# Patient Record
Sex: FEMALE | Race: WHITE | NOT HISPANIC OR LATINO | Employment: UNEMPLOYED | URBAN - METROPOLITAN AREA
[De-identification: names, ages, dates, MRNs, and addresses within clinical notes are randomized per-mention and may not be internally consistent; named-entity substitution may affect disease eponyms.]

---

## 2017-12-06 ENCOUNTER — HISTORICAL (OUTPATIENT)
Dept: LAB | Facility: HOSPITAL | Age: 47
End: 2017-12-06

## 2018-10-17 ENCOUNTER — TELEPHONE (OUTPATIENT)
Dept: TRANSPLANT | Facility: CLINIC | Age: 48
End: 2018-10-17

## 2018-10-19 DIAGNOSIS — I51.89 ATRIAL MASS: ICD-10-CM

## 2018-10-19 DIAGNOSIS — I35.8 AORTIC VALVE MASS: Primary | ICD-10-CM

## 2018-10-30 ENCOUNTER — OFFICE VISIT (OUTPATIENT)
Dept: CARDIOTHORACIC SURGERY | Facility: CLINIC | Age: 48
End: 2018-10-30
Payer: COMMERCIAL

## 2018-10-30 ENCOUNTER — HOSPITAL ENCOUNTER (OUTPATIENT)
Dept: CARDIOLOGY | Facility: CLINIC | Age: 48
Discharge: HOME | End: 2018-10-30
Attending: THORACIC SURGERY (CARDIOTHORACIC VASCULAR SURGERY)
Payer: COMMERCIAL

## 2018-10-30 VITALS
OXYGEN SATURATION: 98 % | WEIGHT: 221.69 LBS | SYSTOLIC BLOOD PRESSURE: 132 MMHG | TEMPERATURE: 98 F | DIASTOLIC BLOOD PRESSURE: 78 MMHG | HEIGHT: 62 IN | BODY MASS INDEX: 40.8 KG/M2 | HEART RATE: 70 BPM

## 2018-10-30 DIAGNOSIS — I51.89 MASS OF RIGHT CARDIAC VENTRICLE: ICD-10-CM

## 2018-10-30 DIAGNOSIS — I51.89 MASS OF HEART: Primary | ICD-10-CM

## 2018-10-30 DIAGNOSIS — I51.89 ATRIAL MASS: ICD-10-CM

## 2018-10-30 LAB
AORTIC VALVE REGURGITATION: ABNORMAL
ESTIMATED PA SYSTOLIC PRESSURE: 29.72
MITRAL VALVE MOBILITY: NORMAL
RETIRED EF AND QEF - SEE NOTES: 55 (ref 55–65)
TRICUSPID VALVE REGURGITATION: ABNORMAL

## 2018-10-30 PROCEDURE — 99999 PR PBB SHADOW E&M-EST. PATIENT-LVL III: CPT | Mod: PBBFAC,,, | Performed by: THORACIC SURGERY (CARDIOTHORACIC VASCULAR SURGERY)

## 2018-10-30 PROCEDURE — 3008F BODY MASS INDEX DOCD: CPT | Mod: CPTII,S$GLB,, | Performed by: THORACIC SURGERY (CARDIOTHORACIC VASCULAR SURGERY)

## 2018-10-30 PROCEDURE — 93306 TTE W/DOPPLER COMPLETE: CPT | Mod: S$GLB,,, | Performed by: INTERNAL MEDICINE

## 2018-10-30 PROCEDURE — 99205 OFFICE O/P NEW HI 60 MIN: CPT | Mod: S$GLB,,, | Performed by: THORACIC SURGERY (CARDIOTHORACIC VASCULAR SURGERY)

## 2018-10-30 RX ORDER — CIPROFLOXACIN 500 MG/1
500 TABLET ORAL 2 TIMES DAILY
Refills: 0 | COMMUNITY
Start: 2018-08-23 | End: 2018-11-27

## 2018-10-30 RX ORDER — SULFAMETHOXAZOLE AND TRIMETHOPRIM 800; 160 MG/1; MG/1
1 TABLET ORAL 2 TIMES DAILY
Refills: 0 | COMMUNITY
Start: 2018-08-07 | End: 2018-11-27

## 2018-10-30 RX ORDER — ISOSORBIDE MONONITRATE 60 MG/1
TABLET, EXTENDED RELEASE ORAL
Refills: 4 | COMMUNITY
Start: 2018-10-19

## 2018-10-30 RX ORDER — METFORMIN HYDROCHLORIDE 500 MG/1
500 TABLET ORAL 2 TIMES DAILY
Refills: 1 | COMMUNITY
Start: 2018-09-27

## 2018-10-30 RX ORDER — FENOFIBRATE 160 MG/1
160 TABLET ORAL DAILY
Refills: 0 | COMMUNITY
Start: 2018-10-19

## 2018-10-30 RX ORDER — FOLIC ACID 1 MG/1
1000 TABLET ORAL DAILY
Refills: 0 | COMMUNITY
Start: 2018-10-19

## 2018-10-30 RX ORDER — NITROGLYCERIN 0.4 MG/1
TABLET SUBLINGUAL
Refills: 4 | COMMUNITY
Start: 2018-10-19

## 2018-10-30 RX ORDER — OXYCODONE HYDROCHLORIDE 15 MG/1
TABLET ORAL
Refills: 0 | COMMUNITY
Start: 2018-10-19

## 2018-10-30 RX ORDER — METRONIDAZOLE 500 MG/1
TABLET ORAL
Refills: 0 | COMMUNITY
Start: 2018-08-23 | End: 2018-11-27

## 2018-10-30 RX ORDER — CITALOPRAM 20 MG/1
20 TABLET, FILM COATED ORAL DAILY
Refills: 1 | COMMUNITY
Start: 2018-10-04

## 2018-10-30 RX ORDER — METHOCARBAMOL 750 MG/1
TABLET, FILM COATED ORAL
Refills: 11 | COMMUNITY
Start: 2018-10-15

## 2018-10-30 RX ORDER — MIRTAZAPINE 15 MG/1
TABLET, FILM COATED ORAL
Refills: 4 | COMMUNITY
Start: 2018-10-15

## 2018-10-30 RX ORDER — PREGABALIN 50 MG/1
CAPSULE ORAL
Refills: 5 | COMMUNITY
Start: 2018-10-23

## 2018-10-30 RX ORDER — ERGOCALCIFEROL 1.25 MG/1
50000 CAPSULE ORAL
Refills: 0 | COMMUNITY
Start: 2018-10-15

## 2018-10-30 RX ORDER — BUSPIRONE HYDROCHLORIDE 15 MG/1
TABLET ORAL
Refills: 1 | COMMUNITY
Start: 2018-10-04 | End: 2018-11-27

## 2018-10-30 RX ORDER — PANTOPRAZOLE SODIUM 40 MG/1
40 TABLET, DELAYED RELEASE ORAL DAILY
Refills: 0 | COMMUNITY
Start: 2018-10-15

## 2018-10-30 RX ORDER — ATORVASTATIN CALCIUM 20 MG/1
20 TABLET, FILM COATED ORAL DAILY
Refills: 0 | COMMUNITY
Start: 2018-10-19

## 2018-10-30 RX ORDER — ENALAPRIL MALEATE 10 MG/1
10 TABLET ORAL DAILY
Refills: 0 | COMMUNITY
Start: 2018-10-15

## 2018-10-30 RX ORDER — CLOBETASOL PROPIONATE 0.05 G/ML
SPRAY TOPICAL
COMMUNITY
Start: 2018-10-23

## 2018-10-30 RX ORDER — SUCRALFATE 1 G/1
TABLET ORAL
Refills: 5 | COMMUNITY
Start: 2018-10-19

## 2018-10-30 NOTE — LETTER
[facility]  [address]  [facility] [address]  Phone: [phone] October 30, 2018      Jah Pickering MD  [address] [address]  [facility] [address]    Patient: Albania Roberts   MR Number: [id number]   YOB: 1970   Date of Visit: 10/30/2018     Dear Dr. Pickering,     I had the pleasure seeing your patient Ms. Albania Roberts in clinic today.  As you know, she is a very pleasant 47-year-old woman who has been followed for some time for a right ventricular mass.  She recently had a CT scan which suggested that this mass had enlarged.  She has undergone a open biopsy of this through a left thoracotomy.  She is unsure of the pathology results.     Obviously, this is a very unusual situation.  We will plan to obtain the pathology report from [facility].  I have also asked her to undergo a cardiac MRI to try to better characterize this lesion.  I am not sure what, if anything, we should do about this, but hopefully that information will help to shed some light.      Thank you for sending this pleasant woman to me.    Sincerely,        Dandy Bey MD   Chief, Division of Thoracic & Cardiovascular Surgery  [facility]    PEP/hcr

## 2018-10-30 NOTE — PROGRESS NOTES
"Patient ID: Albania Roberts is a 47 y.o. female.   Chief Complaint: Consult    HPI   Miss Roberts is a 46 y/o, F, presented for evaluation of a right ventricular mass.     Pt reported that the RV mass was an incidental finding in 2016 from an abdomen MRI after being admitted into the hospital. A thoracostomy under the left breast is done to biopsy the mass on Dec 7, 2017 at [facility]. According to her understanding the pathology of the mass is "cancerous but not active" but she does not have the pathology report with her. Her cardiologist in [place] is concerned that the heart mass is growing bigger as shown on CT, thus send her to [facility] to be evaluated by Dr. Bey.     Currently she has worsening excertional SOB which comes and goes. She also reported non-radiating exertional substernal pain, described as "pinching", 4/10 and is getting worse.    Reported to have BLE edema after long car rides.    Otherwise denies any fever, chills, constipation, headaches or other physical complains.       Review of Systems   Constitutional: Negative for chills, fever and weight loss.   HENT: Negative for hearing loss.    Cardiovascular: Positive for chest pain and leg swelling.   Gastrointestinal: Positive for heartburn. Negative for constipation, nausea and vomiting.   Musculoskeletal: Positive for back pain.   Skin: Negative for rash.   Neurological: Negative for dizziness and headaches.       Current Outpatient Medications   Medication Sig Dispense Refill    atorvastatin (LIPITOR) 20 MG tablet Take 20 mg by mouth once daily.  0    busPIRone (BUSPAR) 15 MG tablet TAKE 1 2 TO 1 TABLET TWICE DAILY AS NEEDED FOR ANXIETY  1    ciprofloxacin HCl (CIPRO) 500 MG tablet Take 500 mg by mouth 2 (two) times daily.  0    citalopram (CELEXA) 20 MG tablet Take 20 mg by mouth once daily.  1    clobetasol (CLOBEX) 0.05 % topical spray       enalapril (VASOTEC) 10 MG tablet Take 10 mg by mouth once daily.  0    fenofibrate " 160 MG Tab Take 160 mg by mouth once daily.  0    folic acid (FOLVITE) 1 MG tablet Take 1,000 mcg by mouth once daily.  0    isosorbide mononitrate (IMDUR) 60 MG 24 hr tablet TAKE 1 TABLET BY MOUTH DAILY IN MORNING  4    LYRICA 50 mg capsule 1 CAPSULE BY MOUTH TWICE A DAY FOR NERVE PAIN  5    metFORMIN (GLUCOPHAGE) 500 MG tablet Take 500 mg by mouth 2 (two) times daily.  1    methocarbamol (ROBAXIN) 750 MG Tab 1 TABLET BY MOUTH EVERY 8 HOURS; PRN MUSCLE SPASMS  11    metroNIDAZOLE (FLAGYL) 500 MG tablet TAKE 1 TABLET BY MOUTH 3 TIMES DAILY FOR 10 DAYS  0    mirtazapine (REMERON) 15 MG tablet TAKE 1 2 TO 1 TABLET AT BEDTIME  4    nitroGLYCERIN (NITROSTAT) 0.4 MG SL tablet 1 TABLET UNDER TONGUE AS DIRECTED IF NEEDED FOR CHEST PAINS   DO NOT EXCEED 3 PER 15 MINUTES  4    oxyCODONE (ROXICODONE) 15 MG Tab TAKE 1 TABLET BY MOUTH 4 TIMES DAILY FOR PAIN  0    pantoprazole (PROTONIX) 40 MG tablet Take 40 mg by mouth once daily.  0    sucralfate (CARAFATE) 1 gram tablet 1 TABLET S  BY MOUTH 3 TIMES DAILY  5    sulfamethoxazole-trimethoprim 800-160mg (BACTRIM DS) 800-160 mg Tab Take 1 tablet by mouth 2 (two) times daily.  0    VITAMIN D2 50,000 unit capsule Take 50,000 Units by mouth every 7 days.  0     No current facility-administered medications for this visit.        Review of patient's allergies indicates:   Allergen Reactions    Codeine Hives       History reviewed. No pertinent past medical history.    History reviewed. No pertinent surgical history.    History reviewed. No pertinent family history.    Social History     Socioeconomic History    Marital status:      Spouse name: Not on file    Number of children: Not on file    Years of education: Not on file    Highest education level: Not on file   Social Needs    Financial resource strain: Not on file    Food insecurity - worry: Not on file    Food insecurity - inability: Not on file    Transportation needs - medical: Not on file     Transportation needs - non-medical: Not on file   Occupational History    Not on file   Tobacco Use    Smoking status: Never Smoker    Smokeless tobacco: Never Used   Substance and Sexual Activity    Alcohol use: No     Frequency: Never    Drug use: Not on file    Sexual activity: Not on file   Other Topics Concern    Not on file   Social History Narrative    Not on file       Vitals:    10/30/18 0950   BP: 132/78   Pulse: 70   Temp: 98.4 °F (36.9 °C)       Physical Exam   Constitutional: She appears well-developed and well-nourished. No distress.   HENT:   Head: Normocephalic.   Neck: No JVD present.   Cardiovascular: Normal rate, regular rhythm, normal heart sounds and intact distal pulses. Exam reveals no friction rub.   No murmur heard.  Pulmonary/Chest: Effort normal and breath sounds normal. No stridor. No respiratory distress. She has no wheezes.   Abdominal: Soft. Bowel sounds are normal. She exhibits no distension and no mass. There is no tenderness. There is no rebound and no guarding.      Assessment & Plan:   Miss Roberts is a 48 y/o, F, presented for evaluation of a right ventricular mass.     +Cardiac MRI  +F/U with cardiac mass pathology report from [facility] Attending Note:    I have personally taken the history and examined this patient and agree with the resident's note as stated above. 47-year-old female with an unusual history.  She was found to have a right ventricular mass.  An open biopsy was performed via a left thoracotomy.  Apparently, pathology specimens were sent.  She is unsure of the tissue diagnosis.  This lesion has been followed for 3 years with CT scans, and had been stable in size until most recently when it was thought to have enlarged.  She has some atypical chest pain, and a history of coronary stents, but no symptoms clearly referable to this lesion.  We will plan to get a copy of the pathology report from [facility], and we will plan for a  cardiac MRI.

## 2018-11-15 ENCOUNTER — DOCUMENTATION ONLY (OUTPATIENT)
Dept: CARDIOTHORACIC SURGERY | Facility: CLINIC | Age: 48
End: 2018-11-15

## 2018-11-15 ENCOUNTER — TELEPHONE (OUTPATIENT)
Dept: CARDIOTHORACIC SURGERY | Facility: CLINIC | Age: 48
End: 2018-11-15

## 2018-11-15 NOTE — TELEPHONE ENCOUNTER
Called patient, left message re: appt change to Nov 20th w Dr. Bey.----- Message from Evelyn Bearden sent at 11/15/2018  6:21 AM CST -----  Contact: self  Patient need to speak with nurse.   Patient  states unable to do MRI scheduled for this morning.  Patient states was advised MRI has not been approved  from insurance yet.   Patient states need to reschedule doctor appointment and MRI.    Please call pt at [phone]

## 2018-11-27 ENCOUNTER — HOSPITAL ENCOUNTER (OUTPATIENT)
Dept: RADIOLOGY | Facility: HOSPITAL | Age: 48
Discharge: HOME | End: 2018-11-27
Attending: THORACIC SURGERY (CARDIOTHORACIC VASCULAR SURGERY)
Payer: COMMERCIAL

## 2018-11-27 ENCOUNTER — OFFICE VISIT (OUTPATIENT)
Dept: CARDIOTHORACIC SURGERY | Facility: CLINIC | Age: 48
End: 2018-11-27
Payer: COMMERCIAL

## 2018-11-27 VITALS
TEMPERATURE: 98 F | SYSTOLIC BLOOD PRESSURE: 135 MMHG | OXYGEN SATURATION: 98 % | DIASTOLIC BLOOD PRESSURE: 76 MMHG | HEART RATE: 68 BPM | BODY MASS INDEX: 41.07 KG/M2 | WEIGHT: 223.19 LBS | HEIGHT: 62 IN

## 2018-11-27 DIAGNOSIS — I51.89 MASS OF RIGHT CARDIAC VENTRICLE: Primary | ICD-10-CM

## 2018-11-27 DIAGNOSIS — I51.89 MASS OF HEART: ICD-10-CM

## 2018-11-27 PROCEDURE — 99999 PR PBB SHADOW E&M-EST. PATIENT-LVL III: CPT | Mod: PBBFAC,,, | Performed by: THORACIC SURGERY (CARDIOTHORACIC VASCULAR SURGERY)

## 2018-11-27 PROCEDURE — 25500020 PHARM REV CODE 255: Performed by: THORACIC SURGERY (CARDIOTHORACIC VASCULAR SURGERY)

## 2018-11-27 PROCEDURE — A9577 INJ MULTIHANCE: HCPCS | Performed by: THORACIC SURGERY (CARDIOTHORACIC VASCULAR SURGERY)

## 2018-11-27 PROCEDURE — 3008F BODY MASS INDEX DOCD: CPT | Mod: CPTII,S$GLB,, | Performed by: THORACIC SURGERY (CARDIOTHORACIC VASCULAR SURGERY)

## 2018-11-27 PROCEDURE — 75561 CARDIAC MRI FOR MORPH W/DYE: CPT | Mod: TC

## 2018-11-27 PROCEDURE — 75561 CARDIAC MRI FOR MORPH W/DYE: CPT | Mod: 26,,, | Performed by: RADIOLOGY

## 2018-11-27 PROCEDURE — 99215 OFFICE O/P EST HI 40 MIN: CPT | Mod: S$GLB,,, | Performed by: THORACIC SURGERY (CARDIOTHORACIC VASCULAR SURGERY)

## 2018-11-27 RX ADMIN — GADOBENATE DIMEGLUMINE 20 ML: 529 INJECTION, SOLUTION INTRAVENOUS at 11:11

## 2018-11-27 NOTE — PROGRESS NOTES
"Subjective:      Patient ID: Albania Roberts is a 47 y.o. female.    Chief Complaint: No chief complaint on file.      HPI:  Albania Roberts is a 47 y.o. female who presents  for evaluation of a right ventricular mass with follow up cardiac MRI     Pt reported that the RV mass was an incidental finding in 2016 from an abdomen MRI after being admitted into the hospital. A thoracostomy under the left breast is done to biopsy the mass on Dec 7, 2017 at [facility]. According to her understanding the pathology of the mass is "cancerous but not active" but she does not have the pathology report with her. Her cardiologist in [place] is concerned that the heart mass is growing bigger as shown on CT, thus send her to [facility] to be evaluated by Dr. Bey.      Currently she has worsening excertional SOB which comes and goes. She also reported non-radiating exertional substernal pain, described as "pinching", 4/10 and is getting worse.    Current medications Reviewed    Review of Systems   Constitutional: Negative for activity change and fatigue.   Respiratory: Negative for cough and shortness of breath.    Cardiovascular: Negative for chest pain, palpitations and leg swelling.   Gastrointestinal: Negative for abdominal pain, nausea and vomiting.   Endocrine: Negative for polydipsia, polyphagia and polyuria.   Genitourinary: Negative for dysuria.   Musculoskeletal: Negative for gait problem.   Skin: Negative for rash.   Allergic/Immunologic: Negative for immunocompromised state.   Neurological: Negative for dizziness, syncope and weakness.   Hematological: Does not bruise/bleed easily.   Psychiatric/Behavioral: Negative for behavioral problems.     Objective:   Physical Exam   Constitutional: She is oriented to person, place, and time. She appears well-developed and well-nourished.   Cardiovascular: Normal rate, regular rhythm and normal heart sounds.   Pulmonary/Chest: Effort normal and breath sounds normal. "   Abdominal: Soft.   Neurological: She is alert and oriented to person, place, and time.   Skin: Skin is warm, dry and intact.   Psychiatric: She has a normal mood and affect.     Diagnotic Results:  Cardiac MRI Conclusion:    1. Normal LV volume with LVEF of 58%.   2. Normal RV volume with RVEF of 63%  3. There is a 24 x 25 x 15 mobile mass with multiple densities and DHE located in the RV free wall trabeculations. 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   4. Lipomatous intra-atrial septum and prominent pericardial fat.     Assessment:   1. Cardiac mass  Plan:   Follow up in 6 months with repeat cardiac MRI    CTS Attending Note:    I have personally taken the history and examined this patient and agree with the NP's note as stated above. I reviewed her cardiac MRI.  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  Given that it is on the right side of the heart, I think the risk of complications is low.  She is not interested in an operation to resect this.  Having reviewed the MRI images, I believe a complete resection would be difficult to achieve.  We will plan to reimage her in 6 months to determine if there is any change in size.

## 2018-11-27 NOTE — LETTER
[facility]  [address]  [facility] [address]  Phone: [phone] November 28, 2018      Jah Pickering MD  [address] [address]  [place] [address]    Patient: Albania Roberts   MR Number: [id number]   YOB: 1970   Date of Visit: 11/27/2018     Dear Dr. Pickering,     I had the pleasure seeing your patient Ms. Albania Roberts in clinic today.  As you know, she is a very pleasant 47-year-old woman with at least a 2 year history of a right ventricular mass.  She underwent an attempted biopsy, which was done through a left thoracotomy.  The pathology report demonstrated only fat.  This is unsurprising, since the mass is within the right ventricle.  She had a cardiac MRI here which demonstrated a right ventricular intracavitary mass most consistent with a papillary myxoma.  It was calcified, which would suggest that it has been there for some time.  Given that it is on the right side of the heart, I think the likelihood of complications associated with this lesion are low.  She understandably is unenthusiastic about having an operation to resect this.  I also am not confident that based on the MRI images we could get a complete resection.  I do not believe this lesion is causing any symptoms at this time.      I will plan to see her back in 6 months with a repeat MRI.      Thank you for sending her to me.    Sincerely,      Dandy Bey MD  Chief, Division of Thoracic & Cardiovascular Surgery  [facility]    PEP/hcr

## 2019-07-17 ENCOUNTER — HISTORICAL (OUTPATIENT)
Dept: ADMINISTRATIVE | Facility: HOSPITAL | Age: 49
End: 2019-07-17

## 2021-08-18 LAB
ABS NEUT (OLG): 3.8 X10(3)/MCL (ref 1.5–6.9)
APPEARANCE, UA: CLEAR
BILIRUB UR QL STRIP: NEGATIVE
BUN SERPL-MCNC: 4 MG/DL (ref 9.8–20.1)
CALCIUM SERPL-MCNC: 9.2 MG/DL (ref 8.4–10.2)
CHLORIDE SERPL-SCNC: 103 MMOL/L (ref 98–107)
CO2 SERPL-SCNC: 24 MMOL/L (ref 22–29)
COLOR UR: YELLOW
CREAT SERPL-MCNC: 0.77 MG/DL (ref 0.55–1.02)
CREAT/UREA NIT SERPL: 5
ERYTHROCYTE [DISTWIDTH] IN BLOOD BY AUTOMATED COUNT: 12.7 % (ref 11.5–17)
EST CREAT CLEARANCE SER (OHS): 126.22 ML/MIN
GLUCOSE (UA): >=1000 MG/DL
GLUCOSE SERPL-MCNC: 362 MG/DL (ref 74–100)
HCT VFR BLD AUTO: 38.6 % (ref 36–48)
HGB BLD-MCNC: 12.5 GM/DL (ref 12–16)
HGB UR QL STRIP: NEGATIVE
KETONES UR QL STRIP: NEGATIVE MG/DL
LEUKOCYTE ESTERASE UR QL STRIP: NEGATIVE
MCH RBC QN AUTO: 28 PG (ref 27–34)
MCHC RBC AUTO-ENTMCNC: 32 GM/DL (ref 31–36)
MCV RBC AUTO: 86 FL (ref 80–99)
NITRITE UR QL STRIP: NEGATIVE
PH UR STRIP: 5 [PH] (ref 4.6–8)
PLATELET # BLD AUTO: 280 X10(3)/MCL (ref 140–400)
PMV BLD AUTO: 11.7 FL (ref 6.8–10)
POTASSIUM SERPL-SCNC: 3.4 MMOL/L (ref 3.5–5.1)
PROT UR QL STRIP: NEGATIVE MG/DL
RBC # BLD AUTO: 4.47 X10(6)/MCL (ref 4.2–5.4)
SARS-COV-2 AG RESP QL IA.RAPID: NOT DETECTED
SODIUM SERPL-SCNC: 137 MMOL/L (ref 136–145)
SP GR UR STRIP: 1.01 (ref 1–1.03)
UROBILINOGEN UR STRIP-ACNC: 0.2 EU/DL
WBC # SPEC AUTO: 7.2 X10(3)/MCL (ref 4.5–11.5)

## 2021-08-19 ENCOUNTER — HISTORICAL (OUTPATIENT)
Dept: ANESTHESIOLOGY | Facility: HOSPITAL | Age: 51
End: 2021-08-19

## 2022-04-07 ENCOUNTER — HISTORICAL (OUTPATIENT)
Dept: ADMINISTRATIVE | Facility: HOSPITAL | Age: 52
End: 2022-04-07
Payer: COMMERCIAL

## 2022-04-24 VITALS
DIASTOLIC BLOOD PRESSURE: 78 MMHG | HEIGHT: 62 IN | OXYGEN SATURATION: 98 % | SYSTOLIC BLOOD PRESSURE: 124 MMHG | BODY MASS INDEX: 39.75 KG/M2 | WEIGHT: 216 LBS

## 2022-04-28 NOTE — OP NOTE
Patient:   Albania Roberts            MRN: [id number]            FIN: [id number]               Age:   48 years     Sex:  Female     :  1970   Associated Diagnoses:   None   Author:   CHI Avery MD      Procedure   Bronchoscopy procedure   Date/ Time:  2019 08:25:00.     Confirmed: patient, procedure, side, site, safety procedures followed.     Performed by: self.     Informed consent: signed by patient.     Indication: hemoptysis.     Preparation: NPO, pre-medications given (local anesthesia, analgesia, minimal sedation).     Technique: type of bronchoscope flexible, route transnasal, monitoring during procedure (blood pressure monitoring, cardiac monitor, continuous pulse oximetry).     Findings: All segmental and subsegmental bronchi were visualized and patent. No endobronchial abnl were found. No blood or signs of recent bleeding were seen..     Procedure tolerated: well.     Estimated blood loss: No blood loss.

## 2022-04-28 NOTE — OP NOTE
PREOPERATIVE DIAGNOSIS:  Right anterior cruciate ligament deficient knee.    POSTOPERATIVE DIAGNOSES:  Right anterior cruciate ligament deficient knee, with some grade 1 to 2 changes of medial femoral condyle.    PROCEDURES:  Arthroscopy, anterior cruciate ligament revision.    GRAFT TYPE:  Posterior tibial tendon allograft with XO adjustable loop button on the femur and a [employer] interference ExoShape tibial fixation with also a screw and a washer for backup.    DESCRIPTION OF PROCEDURE:  Patient was brought to the OR, given IV antibiotics, general anesthesia.  Legs were examined under anesthesia.  She had a previous scar from a bone-tendon-bone ACL reconstruction years ago.  She had a markedly positive Lachman, lateral pivot-shift which was grade 4.  Actually, both knees had grade 4 pivot-shifts.  Prepped and draped.  The graft was thawed and soaked 3 times on the back table.  It was fashioned, whipstitched, and passed through a 10 sizer.  Tourniquet was inflated.  Anterior incision was made.  The tibial interference screw was removed without difficulty.  Next, the knee was examined arthroscopically.  She was found to have a completely re-ruptured ACL graft.  She had some grade 1 to 2 changes of the medial femoral condyle, no significant meniscal tear evident.  Next, using a tibial guide put a guide pin going low on the tibia, creating a new tunnel, but exiting as close to the native insertion site as possible.  This was reamed to a size 10 and then plugged.  Next, the stump was debrided.  I had to remove the femoral screw because it was in a good anatomic position.  Once this was done, I elected to use the drill hole where the screw was on the femur for the femoral tunnel socket.  Put a guidewire, drove it through the shaft of the femur.  The depth of the bone bridge was about 35, so I drilled to 33.  Drilled it with a size 10.  Passed a Ti-Cron suture for pull-through.  Drilled out the lateral cortex  with a size 5 reamer.  The graft was then passed up the tibia into the femoral socket with good seating.  Fluoro was used to confirm the button placed laterally on the femur.  I took the knee through a range of motion.  It was isometric.  The graft was tensioned repeatedly and then pulled snugly down with the knee in full extension.  I dilated the tibial size up to an 11, put in a 12 ExoShape fastener on the tibia.  Toggled each limb of the graft, and it was secure, but I elected to put an additional screw and post.  Made a small incision distal and tied the whipstitches around a 4.5 cortical screw and a washer.  The knee was secure.  The Lachman and lateral pivot-shift were completely eliminated.  Wounds were irrigated, closed with 2-0, staples, sterile dressing applied.  No complications.        TORRES/YADIRA   DD: 08/19/2021 1220   DT: 08/19/2021 1251  Job # [id number]

## 2023-09-29 NOTE — TELEPHONE ENCOUNTER
On 10/5/18 I received outside medical records sent by [facility] from Dr. David Loya. Records including asking Dr. Zavala see and evaluate pt. Records provided to me by [facility] nurse UNIQUE Paige today.  . Communicated with Dr. Zavala this day that I would like to discuss some outside records I had received from a referring physician asking pt be seen by her.   On 10/10/18 left these records for Dr. Zavala to review, to determine if she thought pt should be seen in her clinic, or perhaps another clinic. Also left message that I would be out the following 2 days and asked she let NEDA BROWNE or other HFN know how she would like to proceed for this pt.    10/10/18 4:30   pm:  Dr. Zavala called and informed me she is awaiting to speak with the referring doctor. Said she left message for him to call her. Said she would let me, NEDA BROWNE, Elis, or other HFN know if she is going to see pt , or send to [facility], or other area.   10/15/18:  Did not see any appts scheduled for this pt or message from dr. Zavala.   10/17/18 pm:  Informed by Dr. Zavala she sent the pt to Dr. Bey, and left the records for Elis to fax to his office.   10/17/18   4:55 pm: NEDA Gillis MA told me she hand carried these records and gave to Dr. Bey's nurse last Friday  As of this time, I do not see any notes  Or appts for pt. I have sent down my copy of these medical records to be scanned into pts chart.   
impaired balance/pain/impaired postural control/decreased strength